# Patient Record
Sex: FEMALE | Race: WHITE | NOT HISPANIC OR LATINO | URBAN - METROPOLITAN AREA
[De-identification: names, ages, dates, MRNs, and addresses within clinical notes are randomized per-mention and may not be internally consistent; named-entity substitution may affect disease eponyms.]

---

## 2017-01-11 ENCOUNTER — OUTPATIENT (OUTPATIENT)
Dept: OUTPATIENT SERVICES | Facility: HOSPITAL | Age: 52
LOS: 1 days | End: 2017-01-11
Payer: COMMERCIAL

## 2017-01-11 DIAGNOSIS — Z80.0 FAMILY HISTORY OF MALIGNANT NEOPLASM OF DIGESTIVE ORGANS: ICD-10-CM

## 2017-01-11 PROCEDURE — G0105: CPT

## 2018-04-13 ENCOUNTER — RESULT REVIEW (OUTPATIENT)
Age: 53
End: 2018-04-13

## 2018-11-16 PROBLEM — Z00.00 ENCOUNTER FOR PREVENTIVE HEALTH EXAMINATION: Status: ACTIVE | Noted: 2018-11-16

## 2018-12-12 ENCOUNTER — APPOINTMENT (OUTPATIENT)
Dept: PULMONOLOGY | Facility: CLINIC | Age: 53
End: 2018-12-12

## 2020-01-13 DIAGNOSIS — Z12.39 ENCOUNTER FOR OTHER SCREENING FOR MALIGNANT NEOPLASM OF BREAST: ICD-10-CM

## 2020-02-06 ENCOUNTER — APPOINTMENT (OUTPATIENT)
Dept: OBGYN | Facility: CLINIC | Age: 55
End: 2020-02-06
Payer: COMMERCIAL

## 2020-02-06 VITALS
HEIGHT: 65 IN | DIASTOLIC BLOOD PRESSURE: 70 MMHG | BODY MASS INDEX: 30.07 KG/M2 | SYSTOLIC BLOOD PRESSURE: 126 MMHG | WEIGHT: 180.5 LBS

## 2020-02-06 DIAGNOSIS — Z87.09 PERSONAL HISTORY OF OTHER DISEASES OF THE RESPIRATORY SYSTEM: ICD-10-CM

## 2020-02-06 DIAGNOSIS — Z86.39 PERSONAL HISTORY OF OTHER ENDOCRINE, NUTRITIONAL AND METABOLIC DISEASE: ICD-10-CM

## 2020-02-06 DIAGNOSIS — Z01.419 ENCOUNTER FOR GYNECOLOGICAL EXAMINATION (GENERAL) (ROUTINE) W/OUT ABNORMAL FINDINGS: ICD-10-CM

## 2020-02-06 DIAGNOSIS — Z78.9 OTHER SPECIFIED HEALTH STATUS: ICD-10-CM

## 2020-02-06 DIAGNOSIS — Z82.49 FAMILY HISTORY OF ISCHEMIC HEART DISEASE AND OTHER DISEASES OF THE CIRCULATORY SYSTEM: ICD-10-CM

## 2020-02-06 DIAGNOSIS — Z86.19 PERSONAL HISTORY OF OTHER INFECTIOUS AND PARASITIC DISEASES: ICD-10-CM

## 2020-02-06 DIAGNOSIS — Z80.0 FAMILY HISTORY OF MALIGNANT NEOPLASM OF DIGESTIVE ORGANS: ICD-10-CM

## 2020-02-06 DIAGNOSIS — Z83.3 FAMILY HISTORY OF DIABETES MELLITUS: ICD-10-CM

## 2020-02-06 DIAGNOSIS — Z87.891 PERSONAL HISTORY OF NICOTINE DEPENDENCE: ICD-10-CM

## 2020-02-06 PROCEDURE — 99396 PREV VISIT EST AGE 40-64: CPT

## 2020-02-06 RX ORDER — INSULIN GLARGINE 100 [IU]/ML
INJECTION, SOLUTION SUBCUTANEOUS
Refills: 0 | Status: ACTIVE | COMMUNITY

## 2020-02-06 RX ORDER — METFORMIN HYDROCHLORIDE 500 MG/1
500 TABLET, COATED ORAL
Refills: 0 | Status: ACTIVE | COMMUNITY

## 2020-02-06 RX ORDER — LEVOTHYROXINE SODIUM 0.09 MG/1
88 TABLET ORAL
Refills: 0 | Status: ACTIVE | COMMUNITY

## 2020-02-06 RX ORDER — ROSUVASTATIN CALCIUM 5 MG/1
5 TABLET, FILM COATED ORAL
Refills: 0 | Status: ACTIVE | COMMUNITY

## 2020-02-06 RX ORDER — MONTELUKAST SODIUM 10 MG/1
TABLET, FILM COATED ORAL
Refills: 0 | Status: ACTIVE | COMMUNITY

## 2020-02-06 NOTE — PHYSICAL EXAM
[Awake] : awake [Alert] : alert [Soft] : soft [Oriented x3] : oriented to person, place, and time [No Bleeding] : there was no active vaginal bleeding [Normal] : uterus [No Tenderness] : no rectal tenderness [Uterine Adnexae] : were not tender and not enlarged [Nl Sphincter Tone] : normal sphincter tone [Acute Distress] : no acute distress [Mass] : no breast mass [Nipple Discharge] : no nipple discharge [Axillary LAD] : no axillary lymphadenopathy [Tender] : non tender

## 2020-02-07 LAB — HPV HIGH+LOW RISK DNA PNL CVX: NOT DETECTED

## 2020-02-12 LAB — CYTOLOGY CVX/VAG DOC THIN PREP: ABNORMAL

## 2020-12-23 PROBLEM — Z01.419 ENCOUNTER FOR GYNECOLOGICAL EXAMINATION: Status: RESOLVED | Noted: 2020-02-06 | Resolved: 2020-12-23

## 2021-11-11 ENCOUNTER — APPOINTMENT (OUTPATIENT)
Dept: OBGYN | Facility: CLINIC | Age: 56
End: 2021-11-11
Payer: COMMERCIAL

## 2021-11-11 VITALS — HEIGHT: 61 IN | WEIGHT: 189 LBS | BODY MASS INDEX: 35.68 KG/M2

## 2021-11-11 DIAGNOSIS — Z01.419 ENCOUNTER FOR GYNECOLOGICAL EXAMINATION (GENERAL) (ROUTINE) W/OUT ABNORMAL FINDINGS: ICD-10-CM

## 2021-11-11 PROCEDURE — 99396 PREV VISIT EST AGE 40-64: CPT

## 2021-11-11 NOTE — PHYSICAL EXAM
[Chaperone Present] : A chaperone was present in the examining room during all aspects of the physical examination [Appropriately responsive] : appropriately responsive [Alert] : alert [No Acute Distress] : no acute distress [No Murmurs] : no murmurs [Soft] : soft [Non-tender] : non-tender [Non-distended] : non-distended [No HSM] : No HSM [No Lesions] : no lesions [No Mass] : no mass [Oriented x3] : oriented x3 [Examination Of The Breasts] : a normal appearance [No Masses] : no breast masses were palpable [Labia Majora] : normal [Labia Minora] : normal [Cystocele] : a cystocele [Uterine Prolapse] : uterine prolapse [Normal] : normal [Uterine Adnexae] : normal

## 2021-11-11 NOTE — HISTORY OF PRESENT ILLNESS
[Menarche Age: ____] : age at menarche was [unfilled] [Menopause Age: ____] : age at menopause was [unfilled] [Y] : Patient is sexually active [de-identified] : tubal ligation [PGHxTotal] : 2 [[facility] [PGHxPremature] : 0 [PGHxAbortions] : 0 [[facility] [PGHxABInduced] : 0 [PGHxABSpont] : 0 [PGHxEctopic] : 0 [PGHxMultBirths] : 0

## 2021-11-15 LAB — HPV HIGH+LOW RISK DNA PNL CVX: NOT DETECTED

## 2021-11-16 LAB — CYTOLOGY CVX/VAG DOC THIN PREP: NORMAL

## 2023-07-06 ENCOUNTER — APPOINTMENT (OUTPATIENT)
Dept: OBGYN | Facility: CLINIC | Age: 58
End: 2023-07-06
Payer: COMMERCIAL

## 2023-07-06 VITALS
DIASTOLIC BLOOD PRESSURE: 60 MMHG | BODY MASS INDEX: 29.21 KG/M2 | SYSTOLIC BLOOD PRESSURE: 110 MMHG | HEIGHT: 61 IN | WEIGHT: 154.7 LBS

## 2023-07-06 DIAGNOSIS — Z13.820 ENCOUNTER FOR SCREENING FOR OSTEOPOROSIS: ICD-10-CM

## 2023-07-06 DIAGNOSIS — Z12.39 ENCOUNTER FOR OTHER SCREENING FOR MALIGNANT NEOPLASM OF BREAST: ICD-10-CM

## 2023-07-06 DIAGNOSIS — Z78.0 ASYMPTOMATIC MENOPAUSAL STATE: ICD-10-CM

## 2023-07-06 DIAGNOSIS — N81.2 INCOMPLETE UTEROVAGINAL PROLAPSE: ICD-10-CM

## 2023-07-06 DIAGNOSIS — Z01.419 ENCOUNTER FOR GYNECOLOGICAL EXAMINATION (GENERAL) (ROUTINE) W/OUT ABNORMAL FINDINGS: ICD-10-CM

## 2023-07-06 PROCEDURE — 99396 PREV VISIT EST AGE 40-64: CPT

## 2023-07-06 RX ORDER — DUPILUMAB 300 MG/2ML
300 INJECTION, SOLUTION SUBCUTANEOUS
Refills: 0 | Status: ACTIVE | COMMUNITY

## 2023-07-06 RX ORDER — CHOLECALCIFEROL (VITAMIN D3) 125 MCG
TABLET ORAL
Refills: 0 | Status: ACTIVE | COMMUNITY

## 2023-07-06 NOTE — PHYSICAL EXAM
[Chaperone Present] : A chaperone was present in the examining room during all aspects of the physical examination [Appropriately responsive] : appropriately responsive [Alert] : alert [No Acute Distress] : no acute distress [Soft] : soft [Non-tender] : non-tender [Non-distended] : non-distended [No HSM] : No HSM [No Lesions] : no lesions [No Mass] : no mass [Oriented x3] : oriented x3 [Examination Of The Breasts] : a normal appearance [No Masses] : no breast masses were palpable [Labia Majora] : normal [Labia Minora] : normal [Cystocele] : a cystocele [Uterine Prolapse] : uterine prolapse [Normal] : normal [Uterine Adnexae] : normal [No Tenderness] : no tenderness [Nl Sphincter Tone] : normal sphincter tone

## 2023-07-06 NOTE — HISTORY OF PRESENT ILLNESS
[Y] : Positive pregnancy history [Menarche Age: ____] : age at menarche was [unfilled] [Menopause Age: ____] : age at menopause was [unfilled] [PGHxTotal] : 2 [[facility] : [facility] [PGHxPremature] : 0 [PGHxAbortions] : 0 [[facility] : [facility] [PGHxABInduced] : 0 [PGHxABSpont] : 0 [PGHxEctopic] : 0 [PGHxMultBirths] : 0

## 2023-07-07 LAB — HPV HIGH+LOW RISK DNA PNL CVX: NOT DETECTED

## 2023-07-10 LAB — CYTOLOGY CVX/VAG DOC THIN PREP: ABNORMAL
